# Patient Record
Sex: MALE | Race: WHITE | NOT HISPANIC OR LATINO | Employment: FULL TIME
[De-identification: names, ages, dates, MRNs, and addresses within clinical notes are randomized per-mention and may not be internally consistent; named-entity substitution may affect disease eponyms.]

---

## 2024-04-15 ENCOUNTER — TRANSCRIBE ORDERS (OUTPATIENT)
Dept: OTHER | Age: 54
End: 2024-04-15

## 2024-04-15 DIAGNOSIS — M62.561 ATROPHY OF MUSCLE OF RIGHT LOWER LEG: Primary | ICD-10-CM

## 2024-04-26 NOTE — TELEPHONE ENCOUNTER
DIAGNOSIS: running gait    APPOINTMENT DATE: 5/10/2024   NOTES STATUS DETAILS   OFFICE NOTE from referring provider  self referral    OFFICE NOTE from other specialist Care Everywhere 4/12/2024 Atrophy of muscle of right lower leg

## 2024-05-09 ENCOUNTER — TELEPHONE (OUTPATIENT)
Dept: ORTHOPEDICS | Facility: CLINIC | Age: 54
End: 2024-05-09
Payer: COMMERCIAL

## 2024-05-09 DIAGNOSIS — R29.898 RIGHT LEG WEAKNESS: Primary | ICD-10-CM

## 2024-05-09 NOTE — TELEPHONE ENCOUNTER
PEEWEE LVM for patient to return call to clinic at [phone] to further discuss his appointment with Dr. Mitchell on 5/10/2024 for his right leg. Patient is being referred by Dr. Johanny Morgan with [facility].    PEEWEE is inquiring if patient has had any imaging completed of his lower legs or low back.    PEEWEE Glez

## 2024-05-09 NOTE — TELEPHONE ENCOUNTER
Other: FYI- returning msg- last imaging was done in 2003 @ [facility]. Has a copy on disc he's bringing

## 2024-05-10 ENCOUNTER — PRE VISIT (OUTPATIENT)
Dept: ORTHOPEDICS | Facility: CLINIC | Age: 54
End: 2024-05-10

## 2024-05-10 ENCOUNTER — TELEPHONE (OUTPATIENT)
Dept: ORTHOPEDICS | Facility: CLINIC | Age: 54
End: 2024-05-10

## 2024-05-10 ENCOUNTER — OFFICE VISIT (OUTPATIENT)
Dept: ORTHOPEDICS | Facility: CLINIC | Age: 54
End: 2024-05-10
Payer: COMMERCIAL

## 2024-05-10 ENCOUNTER — ANCILLARY PROCEDURE (OUTPATIENT)
Dept: GENERAL RADIOLOGY | Facility: CLINIC | Age: 54
End: 2024-05-10
Attending: PREVENTIVE MEDICINE
Payer: COMMERCIAL

## 2024-05-10 DIAGNOSIS — M51.369 DDD (DEGENERATIVE DISC DISEASE), LUMBAR: Primary | ICD-10-CM

## 2024-05-10 DIAGNOSIS — M51.06 LUMBAR DISC HERNIATION WITH MYELOPATHY: ICD-10-CM

## 2024-05-10 DIAGNOSIS — M62.561 ATROPHY OF MUSCLE OF RIGHT LOWER LEG: ICD-10-CM

## 2024-05-10 DIAGNOSIS — R29.898 RIGHT LEG WEAKNESS: ICD-10-CM

## 2024-05-10 PROCEDURE — 99204 OFFICE O/P NEW MOD 45 MIN: CPT | Performed by: PREVENTIVE MEDICINE

## 2024-05-10 PROCEDURE — 72100 X-RAY EXAM L-S SPINE 2/3 VWS: CPT | Performed by: STUDENT IN AN ORGANIZED HEALTH CARE EDUCATION/TRAINING PROGRAM

## 2024-05-10 RX ORDER — ROSUVASTATIN CALCIUM 10 MG/1
10 TABLET, COATED ORAL DAILY
COMMUNITY
Start: 2022-12-16

## 2024-05-10 NOTE — TELEPHONE ENCOUNTER
PEEWEE spoke with patient who confirmed he has only had an XR of his right knee completed back in 2003. He explains that he lives an active life style and continues to run competitively. He has noticed over the last 2-3 months he has experience right leg weakness. He has been to physical therapy and who explained he is experiencing muscle atrophy over his right quad.He denies pain, tingling, numbing or burning sensations.     ATC explained that the cause of this issue could be coming from his lumbar spine and the patient agreed to XR's of lumbar spine today. Patient will arrive at 1:45pm.    Patient was appreciative of the return call and had no further questions.    PEEWEE Glez

## 2024-05-10 NOTE — PROGRESS NOTES
HISTORY OF PRESENT ILLNESS  Mr. Wyatt is a pleasant 53 year old year old male who presents to clinic today with the following:    What problem are you here for: Evaluation of right lateral thigh weakness and muscle atrophy. He reports this issue does not affect his running or activity levels.     How long have you had this problem: 1 month    Have you had any recent imaging of this problem? Xrays/MRI/CT scans:  - XR of lumbar spine completed at appointment today.     Have you had treatments for this problem in the past?  -Medications: None   -Physical therapy: Yes, December 2023 he started physical therapy at [facility] for his right calf/Achilles.   -Injections: None   -Surgery: None   - Acupuncture Treatment    How severe is this problem today? 0-10 scale: 0/10    What do you think caused this problem: None     Does this problem or its symptoms cause difficulty for you falling asleep or staying asleep: No     Anything else you want us to know about this problem:  - Patient runs 40-45 miles per week.   - Works with a  for overall strength.   - Pediatrician in [place] ([facility]).     MEDICAL HISTORY  There is no problem list on file for this patient.      Current Outpatient Medications   Medication Sig Dispense Refill     rosuvastatin (CRESTOR) 10 MG tablet Take 10 mg by mouth daily         No Known Allergies    No family history on file.  Social History     Socioeconomic History     Marital status:      Social Determinants of Health      Received from [employer] & [facility]    Social [place]       Additional medical/Social/Surgical histories reviewed in [facility] and updated as appropriate.     REVIEW OF SYSTEMS (5/10/2024)  10 point ROS of systems including Constitutional, Eyes, Respiratory, Cardiovascular, Gastroenterology, Genitourinary, Integumentary, Musculoskeletal, Psychiatric, Allergic/Immunologic were all negative except for pertinent positives noted in  my HPI.     PHYSICAL EXAM  VSS    General  - normal appearance, in no obvious distress  HEENT  - conjunctivae not injected, moist mucous membranes, normocephalic/atraumatic head, ears normal appearance, no lesions, mouth normal appearance, no scars, normal dentition and teeth present  CV  - normal peripheral perfusion  Pulm  - normal respiratory pattern, non-labored  Musculoskeletal - lumbar spine  - stance: normal gait without limp, no obvious leg length discrepancy, normal heel and toe walk  - inspection: normal bone and joint alignment, no obvious scoliosis, has atrophy of right abductor/hamstring muscles vs. left  - palpation: no paravertebral or bony tenderness  - ROM: flexion does not exacerbates pain, normal extension, sidebending, rotation  - strength: lower extremities 5/5 in all planes  - special tests:  (+) straight leg raise- some right sided  (-) slump test  Neuro  - patellar and Achilles DTRs 2+ bilaterally, some right lower extremity sensory deficit throughout L5 distribution, grossly normal coordination, normal muscle tone  Skin  - no ecchymosis, erythema, warmth, or induration, no obvious rash  Psych  - interactive, appropriate, normal mood and affect      ASSESSMENT & PLAN  52 yo male with right leg muscle atrophy, sciatica, due to lumbar disc herniation, ddd, chronic nerve root impingement    I independently reviewed the following imaging studies:  Lumbar xray: shows ddd  Discussed and ordered lumbar MRI for further evaluation  Discussed and ordered EMG for evaluation of muscle atrophy      Activity as tolerated  Has completed over 6 weeks of physical therapy to improve symptoms in right leg and muscle atrophy    Appropriate PPE was utilized for prevention of spread of Covid-19.  Jordon Mitchell MD, CAM

## 2024-05-10 NOTE — TELEPHONE ENCOUNTER
Left Voicemail (1st Attempt) for the patient to call back and schedule the following:    Appointment type: return   Provider: dr. landry  Return date: couple days after mri and emg   Specialty phone number: [phone]  Additional appointment(s) needed: mri and emg  Additonal Notes: discuss the results of mri and emg.     Ene trotter Complex   Orthopedics, Podiatry, Sports Medicine, Ent ,Eye , Audiology, Adult Endocrine & Diabetes, Nutrition & Medication Therapy Management Specialties   [facility]

## 2024-05-10 NOTE — LETTER
5/10/2024      RE: Cristino Wyatt  [address] [facility] [address]     Dear Colleague,    Thank you for referring your patient, Cristino Wyatt, to the [facility] SPORTS MEDICINE CLINIC [place]. Please see a copy of my visit note below.    HISTORY OF PRESENT ILLNESS  Mr. Wyatt is a pleasant 53 year old year old male who presents to clinic today with the following:    What problem are you here for: Evaluation of right lateral thigh weakness and muscle atrophy. He reports this issue does not affect his running or activity levels.     How long have you had this problem: 1 month    Have you had any recent imaging of this problem? Xrays/MRI/CT scans:  - XR of lumbar spine completed at appointment today.     Have you had treatments for this problem in the past?  -Medications: None   -Physical therapy: Yes, December 2023 he started physical therapy at [facility] for his right calf/Achilles.   -Injections: None   -Surgery: None   - Acupuncture Treatment    How severe is this problem today? 0-10 scale: 0/10    What do you think caused this problem: None     Does this problem or its symptoms cause difficulty for you falling asleep or staying asleep: No     Anything else you want us to know about this problem:  - Patient runs 40-45 miles per week.   - Works with a  for overall strength.   - Pediatrician in [place] ([facility]).     MEDICAL HISTORY  There is no problem list on file for this patient.      Current Outpatient Medications   Medication Sig Dispense Refill    rosuvastatin (CRESTOR) 10 MG tablet Take 10 mg by mouth daily         No Known Allergies    No family history on file.  Social History     Socioeconomic History    Marital status:      Social Determinants of Health      Received from [employer] & [facility]    [employer]       Additional medical/Social/Surgical histories reviewed in [place] and updated as appropriate.     REVIEW  OF SYSTEMS (5/10/2024)  10 point ROS of systems including Constitutional, Eyes, Respiratory, Cardiovascular, Gastroenterology, Genitourinary, Integumentary, Musculoskeletal, Psychiatric, Allergic/Immunologic were all negative except for pertinent positives noted in my HPI.     PHYSICAL EXAM  VSS    General  - normal appearance, in no obvious distress  HEENT  - conjunctivae not injected, moist mucous membranes, normocephalic/atraumatic head, ears normal appearance, no lesions, mouth normal appearance, no scars, normal dentition and teeth present  CV  - normal peripheral perfusion  Pulm  - normal respiratory pattern, non-labored  Musculoskeletal - lumbar spine  - stance: normal gait without limp, no obvious leg length discrepancy, normal heel and toe walk  - inspection: normal bone and joint alignment, no obvious scoliosis, has atrophy of right abductor/hamstring muscles vs. left  - palpation: no paravertebral or bony tenderness  - ROM: flexion does not exacerbates pain, normal extension, sidebending, rotation  - strength: lower extremities 5/5 in all planes  - special tests:  (+) straight leg raise- some right sided  (-) slump test  Neuro  - patellar and Achilles DTRs 2+ bilaterally, some right lower extremity sensory deficit throughout L5 distribution, grossly normal coordination, normal muscle tone  Skin  - no ecchymosis, erythema, warmth, or induration, no obvious rash  Psych  - interactive, appropriate, normal mood and affect      ASSESSMENT & PLAN  52 yo male with right leg muscle atrophy, sciatica, due to lumbar disc herniation, ddd, chronic nerve root impingement    I independently reviewed the following imaging studies:  Lumbar xray: shows ddd  Discussed and ordered lumbar MRI for further evaluation  Discussed and ordered EMG for evaluation of muscle atrophy      Activity as tolerated  Has completed over 6 weeks of physical therapy to improve symptoms in right leg and muscle atrophy    Appropriate PPE was  utilized for prevention of spread of Covid-19.  Jordon iMtchell MD, CAM

## 2024-05-16 NOTE — TELEPHONE ENCOUNTER
Left Voicemail (2nd Attempt) for the patient to call back and schedule the following:     Appointment type: return   Provider: dr. landry  Return date: couple days after mri and emg   Specialty phone number: [phone]  Additional appointment(s) needed: mri and emg  Additonal Notes: discuss the results of mri and emg.      Ene trotter Complex   Orthopedics, Podiatry, Sports Medicine, Ent ,Eye , Audiology, Adult Endocrine & Diabetes, Nutrition & Medication Therapy Management Specialties   [facility]

## 2024-05-17 ENCOUNTER — TELEPHONE (OUTPATIENT)
Dept: ORTHOPEDICS | Facility: CLINIC | Age: 54
End: 2024-05-17
Payer: COMMERCIAL

## 2024-05-17 NOTE — TELEPHONE ENCOUNTER
Other: pt of Dr. Mitchell has an MRI scheduled on 5/31 which is soonest available.  He would like a f/unit(s) visit or telephone call for results before pt leaves on vacation on 6/9.  I'm finding soonest openings for Dr. Mitchell on 6/13.  Can you hep find a sooner appt for him?       Could we send this information to you in [place] or would you prefer to receive a phone call?:   Patient would prefer a phone call   Okay to leave a detailed message?: Yes at Cell number on file:    Telephone Information:   Mobile [phone]

## 2024-05-19 ENCOUNTER — HEALTH MAINTENANCE LETTER (OUTPATIENT)
Age: 54
End: 2024-05-19

## 2024-05-20 ENCOUNTER — MYC MEDICAL ADVICE (OUTPATIENT)
Dept: ORTHOPEDICS | Facility: CLINIC | Age: 54
End: 2024-05-20
Payer: COMMERCIAL

## 2024-05-20 NOTE — TELEPHONE ENCOUNTER
Patient Contacted for the patient to call back and schedule the following:    Appointment type: Return  Provider: Dr Mitchell  Return date: ext avail  Specialty phone number: [phone]  Additonal Notes: PT could not do Follow up in June with Paula, requesting sooner, sent to Paula's team regarding questions

## 2024-05-22 DIAGNOSIS — M62.561 ATROPHY OF MUSCLE OF RIGHT LOWER LEG: Primary | ICD-10-CM

## 2024-05-22 DIAGNOSIS — R29.898 RIGHT LEG WEAKNESS: ICD-10-CM

## 2024-05-29 ENCOUNTER — MYC MEDICAL ADVICE (OUTPATIENT)
Dept: ORTHOPEDICS | Facility: CLINIC | Age: 54
End: 2024-05-29
Payer: COMMERCIAL

## 2024-05-30 ENCOUNTER — TELEPHONE (OUTPATIENT)
Dept: ORTHOPEDICS | Facility: CLINIC | Age: 54
End: 2024-05-30
Payer: COMMERCIAL

## 2024-05-31 ENCOUNTER — ANCILLARY PROCEDURE (OUTPATIENT)
Dept: MRI IMAGING | Facility: CLINIC | Age: 54
End: 2024-05-31
Attending: PREVENTIVE MEDICINE
Payer: COMMERCIAL

## 2024-05-31 ENCOUNTER — ANCILLARY PROCEDURE (OUTPATIENT)
Dept: GENERAL RADIOLOGY | Facility: CLINIC | Age: 54
End: 2024-05-31
Attending: PREVENTIVE MEDICINE
Payer: COMMERCIAL

## 2024-05-31 DIAGNOSIS — M51.369 DDD (DEGENERATIVE DISC DISEASE), LUMBAR: ICD-10-CM

## 2024-05-31 DIAGNOSIS — M62.561 ATROPHY OF MUSCLE OF RIGHT LOWER LEG: ICD-10-CM

## 2024-05-31 DIAGNOSIS — R29.898 RIGHT LEG WEAKNESS: ICD-10-CM

## 2024-05-31 DIAGNOSIS — M51.06 LUMBAR DISC HERNIATION WITH MYELOPATHY: ICD-10-CM

## 2024-05-31 PROCEDURE — 72148 MRI LUMBAR SPINE W/O DYE: CPT | Mod: GC | Performed by: RADIOLOGY

## 2024-05-31 PROCEDURE — 77073 BONE LENGTH STUDIES: CPT | Performed by: RADIOLOGY

## 2024-06-03 ENCOUNTER — VIRTUAL VISIT (OUTPATIENT)
Dept: ORTHOPEDICS | Facility: CLINIC | Age: 54
End: 2024-06-03
Payer: COMMERCIAL

## 2024-06-03 DIAGNOSIS — R29.898 RIGHT LEG WEAKNESS: ICD-10-CM

## 2024-06-03 DIAGNOSIS — M51.06 LUMBAR DISC HERNIATION WITH MYELOPATHY: ICD-10-CM

## 2024-06-03 DIAGNOSIS — M62.561 ATROPHY OF MUSCLE OF RIGHT LOWER LEG: Primary | ICD-10-CM

## 2024-06-03 PROCEDURE — 99213 OFFICE O/P EST LOW 20 MIN: CPT | Mod: 95 | Performed by: PREVENTIVE MEDICINE

## 2024-06-03 NOTE — LETTER
6/3/2024         RE: Cristino Wyatt  [address] [place] [id number]        Dear Colleague,    Thank you for referring your patient, Cristino Wyatt, to the [facility] SP[facility]TS MEDICINE CLINIC [place]. Please see a copy of my visit note below.    Cristino is a 53 year old who is being evaluated via a billable video visit.    How would you like to obtain your AVS? MyChart  If the video visit is dropped, the invitation should be resent by: Text to cell phone: [phone]  Will anyone else be joining your video visit? No    No follow-ups on file.    Subjective  Cristino is a 53 year old, presenting for the following health issues:  RECHECK (Follow up to review lumbar MRI results from 5/31/2024 and six foot standing alignment XR. )    HPI     Followup for lumbar MRI and leg length xrays  Overall feels some improvement in strength and flexibility in right leg after starting more physical therapy  Wants to review his MRI and xrays  No new symptoms      Review of Systems  Constitutional, HEENT, cardiovascular, pulmonary, gi and gu systems are negative, except as otherwise noted.      Objective          Vitals:  No vitals were obtained today due to virtual visit.    Physical Exam   GENERAL: alert and no distress  EYES: Eyes grossly normal to inspection.  No discharge or erythema, or obvious scleral/conjunctival abnormalities.  RESP: No audible wheeze, cough, or visible cyanosis.    SKIN: Visible skin clear. No significant rash, abnormal pigmentation or lesions.  NEURO: Cranial nerves grossly intact.  Mentation and speech appropriate for age.  PSYCH: Appropriate affect, tone, and pace of words      Assessment/Plan  54 yo male with lumbar ddd, radiculopathy, right leg weakness, chronic, stable  Reviewed lumbar MRI shows : Lumbar spondylosis, greatest in L5-S1, with asymmetric  disc bulge and superimposed left extraforaminal disc protrusion  abutting the exiting L5 nerve. Additional disc bulge with  superimposed  left foraminal extrusion at L3-4. No spinal canal narrowing at any  level.   Discussed and reviewed xrays legs: WNL no significant leg length discrepancy  Cont. PT  Followup this fall after EMG or sooner if condition changes/worsens      Video-Visit Details    Type of service:  Video Visit   Originating Location (pt. Location): Home  Video start: 1120am  Video end:1140am  Distant Location (provider location):  On-site  Platform used for Video Visit: Vashti  Signed Electronically by: MD Dr Paula Tian      Again, thank you for allowing me to participate in the care of your patient.        Sincerely,        Jordon Mitchell MD

## 2024-06-03 NOTE — LETTER
6/3/2024         RE: Cristino Wyatt  [address] [place] [id number]        Dear Colleague,    Thank you for referring your patient, Cristino Wyatt, to the [facility] MEDIC[facility] [place]. Please see a copy of my visit note below.    Cristino is a 53 year old who is being evaluated via a billable video visit.    How would you like to obtain your AVS? MyChart  If the video visit is dropped, the invitation should be resent by: Text to cell phone: [phone]  Will anyone else be joining your video visit? No    No follow-ups on file.    Subjective  Cristino is a 53 year old, presenting for the following health issues:  RECHECK (Follow up to review lumbar MRI results from 5/31/2024 and six foot standing alignment XR. )    HPI     Followup for lumbar MRI and leg length xrays  Overall feels some improvement in strength and flexibility in right leg after starting more physical therapy  Wants to review his MRI and xrays  No new symptoms      Review of Systems  Constitutional, HEENT, cardiovascular, pulmonary, gi and gu systems are negative, except as otherwise noted.      Objective          Vitals:  No vitals were obtained today due to virtual visit.    Physical Exam   GENERAL: alert and no distress  EYES: Eyes grossly normal to inspection.  No discharge or erythema, or obvious scleral/conjunctival abnormalities.  RESP: No audible wheeze, cough, or visible cyanosis.    SKIN: Visible skin clear. No significant rash, abnormal pigmentation or lesions.  NEURO: Cranial nerves grossly intact.  Mentation and speech appropriate for age.  PSYCH: Appropriate affect, tone, and pace of words      Assessment/Plan  54 yo male with lumbar ddd, radiculopathy, right leg weakness, chronic, stable  Reviewed lumbar MRI shows : Lumbar spondylosis, greatest in L5-S1, with asymmetric  disc bulge and superimposed left extraforaminal disc protrusion  abutting the exiting L5 nerve. Additional disc bulge with  superimposed  left foraminal extrusion at L3-4. No spinal canal narrowing at any  level.   Discussed and reviewed xrays legs: WNL no significant leg length discrepancy  Cont. PT  Followup this fall after EMG or sooner if condition changes/worsens      Video-Visit Details    Type of service:  Video Visit   Originating Location (pt. Location): Home  Video start: 1120am  Video end:1140am  Distant Location (provider location):  On-site  Platform used for Video Visit: Vashti  Signed Electronically by: MD Dr Paula Tian      Again, thank you for allowing me to participate in the care of your patient.        Sincerely,        Jordon Mitchell MD

## 2024-06-03 NOTE — PROGRESS NOTES
Cristino is a 53 year old who is being evaluated via a billable video visit.    How would you like to obtain your AVS? MyChart  If the video visit is dropped, the invitation should be resent by: Text to cell phone: [phone]  Will anyone else be joining your video visit? No    No follow-ups on file.    Subjective   Cristino is a 53 year old, presenting for the following health issues:  RECHECK (Follow up to review lumbar MRI results from 5/31/2024 and six foot standing alignment XR. )    HPI     Followup for lumbar MRI and leg length xrays  Overall feels some improvement in strength and flexibility in right leg after starting more physical therapy  Wants to review his MRI and xrays  No new symptoms      Review of Systems  Constitutional, HEENT, cardiovascular, pulmonary, gi and gu systems are negative, except as otherwise noted.      Objective           Vitals:  No vitals were obtained today due to virtual visit.    Physical Exam   GENERAL: alert and no distress  EYES: Eyes grossly normal to inspection.  No discharge or erythema, or obvious scleral/conjunctival abnormalities.  RESP: No audible wheeze, cough, or visible cyanosis.    SKIN: Visible skin clear. No significant rash, abnormal pigmentation or lesions.  NEURO: Cranial nerves grossly intact.  Mentation and speech appropriate for age.  PSYCH: Appropriate affect, tone, and pace of words      Assessment/Plan  52 yo male with lumbar ddd, radiculopathy, right leg weakness, chronic, stable  Reviewed lumbar MRI shows : Lumbar spondylosis, greatest in L5-S1, with asymmetric  disc bulge and superimposed left extraforaminal disc protrusion  abutting the exiting L5 nerve. Additional disc bulge with superimposed  left foraminal extrusion at L3-4. No spinal canal narrowing at any  level.   Discussed and reviewed xrays legs: WNL no significant leg length discrepancy  Cont. PT  Followup this fall after EMG or sooner if condition changes/worsens      Video-Visit  Details    Type of service:  Video Visit   Originating Location (pt. Location): Home  Video start: 1120am  Video end:1140am  Distant Location (provider location):  On-site  Platform used for Video Visit: Vashti  Signed Electronically by: MD Dr Paula Tian

## 2024-09-19 ENCOUNTER — OFFICE VISIT (OUTPATIENT)
Dept: NEUROLOGY | Facility: CLINIC | Age: 54
End: 2024-09-19
Attending: PREVENTIVE MEDICINE
Payer: COMMERCIAL

## 2024-09-19 DIAGNOSIS — M51.369 DDD (DEGENERATIVE DISC DISEASE), LUMBAR: ICD-10-CM

## 2024-09-19 DIAGNOSIS — M62.561 ATROPHY OF MUSCLE OF RIGHT LOWER LEG: ICD-10-CM

## 2024-09-19 DIAGNOSIS — R29.898 RIGHT LEG WEAKNESS: ICD-10-CM

## 2024-09-19 PROCEDURE — 95886 MUSC TEST DONE W/N TEST COMP: CPT | Performed by: INTERNAL MEDICINE

## 2024-09-19 PROCEDURE — 95885 MUSC TST DONE W/NERV TST LIM: CPT | Mod: 59 | Performed by: INTERNAL MEDICINE

## 2024-09-19 PROCEDURE — 95910 NRV CNDJ TEST 7-8 STUDIES: CPT | Performed by: INTERNAL MEDICINE

## 2024-09-19 NOTE — LETTER
2024      Cristino Wyatt  [address] [facility] [address]      Dear Colleague,    Thank you for referring your patient, Cristino Wyatt, to the [facility] NEUROLOGY CLINIC [place]. Please see a copy of my visit note below.                        [facility]  Electrodiagnostic Laboratory                 Department of Neurology                                                                                                         Test Date:  2024    Patient: Cristino Wyatt : 1970 Physician: George Lacey MD   Sex: Male AGE: 54 year Ref Phys:    ID#: [id number]   Technician: Francisco Rodriguez     History and Examination:  Patient is a 55 y/o male who presents for evaluation of right hamstring atrophy. EMG ordered to evaluate for radiculopathy.     Techniques:  Motor conduction studies were done with surface recording electrodes. Sensory conduction studies were performed with surface electrodes, unless indicated otherwise by (n), designating the use of subdermal recording electrodes. Temperature was monitored and recorded throughout the study. Upper extremities were maintained at a temperature of 32 degrees Centigrade or higher.  EMG was done with a concentric needle electrode.     Results:  All nerve conduction studies (as indicated in the following tables) were within normal limits.      All F Wave latencies were within normal limits.      With the exception of possible small short duration motor unit action potentials in the right semitendinosus, all examined muscles (as indicated in the following table) showed no evidence of electrical instability.        Interpretation:  This is a mildly abnormal examination. There is electrophysiologic evidence of mild asymmetry in the right semitendinosus motor unit action potentials, which could be reflective for focal right sided myopathy in the corrective clinical context. MRI right thigh is recommended to evaluate for  radiographic myopathic changes in the right semitendinosus.     ___________________________  George Lacey MD        Nerve Conduction Studies  Motor Sites      Latency Neg. Amp Neg. Amp Diff Segment Distance Velocity Neg. Dur Neg Area Diff Temperature Comment   Site (ms) Norm (mV) Norm (%)  cm m/s Norm (ms) (%) ( C)    Left Fibular (EDB) Motor   Ankle 5.3  < 6.0 4.3 -  Ankle-EDB 8   4.7  30.9    Bel Fibular Head 11.6 - 3.8 - -12 Bel Fibular Head-Ankle 29.5 47  > 38 4.8 -12 30.9    Pop Fossa 13.6 - 3.5 - -8 Pop Fossa-Bel Fibular Head 10 50  > 38 4.8 -3 30.9    Right Fibular (EDB) Motor   Ankle 4.8  < 6.0 4.4 -  Ankle-EDB 8   5.6  30.8    Bel Fibular Head 11.3 - 4.3 - -2 Bel Fibular Head-Ankle 29 45  > 38 5.6 2 30.8    Pop Fossa 13.4 - 3.8 - -12 Pop Fossa-Bel Fibular Head 10 48  > 38 5.8 -8 30.7    Left Tibial (AHB) Motor   Ankle 3.9  < 6.5 14.2  > 5.0  Ankle-AH 8   6.0  31    Knee 13.4 - 8.2 - -42 Knee-Ankle 41 43  > 38 6.4 -28 31    Right Tibial (AHB) Motor   Ankle 6.1  < 6.5 12.2  > 5.0  Ankle-AH 8   3.3  30.8    Knee 15.3 - 8.8 - -28 Knee-Ankle 44 48  > 38 3.8 -21 30.8      F-Wave Sites      Min F-Lat Max-Min F-Lat Mean F-Lat   Site (ms) (ms) (ms)   Left Tibial F-Wave   Ankle 50.5 3.0 -   Right Tibial F-Wave   Ankle 46.2 5.7 -     Sensory Sites      Onset Lat Peak Lat Amp (O-P) Amp (P-P) Segment Distance Velocity Temperature Comment   Site ms (ms)  V Norm ( V)  cm m/s Norm ( C)    Left Superficial Fibular Sensory   Lower Leg-Ankle 2.5 3.3 28  > 3 32 Lower Leg-Ankle 12.5 50 - 31    Right Superficial Fibular Sensory   Lower Leg-Ankle 2.4 3.2 27  > 3 31 Lower Leg-Ankle 12.5 52 - 31.6    Left Sural Sensory   Calf-Lat Malleolus 3.1 4.0 23  > 5 32 Calf-Lat Malleolus 14 45  > 38 30.5    Right Sural Sensory   Calf-Lat Malleolus 3.0 3.8 34  > 5 46 Calf-Lat Malleolus 14 47  > 38 30.8        Electromyography     Side Muscle Ins Act Fibs/PSW Fasc HF Amp Dur Poly Recrt Int Pat   Right Tib ant Nml None Nml 0 Nml Nml 0 Nml Nml    Right Gastroc Nml None Nml 0 Nml Nml 0 Nml Nml   Right Vastus med Nml None Nml 0 Nml Nml 0 Nml Nml   Right Iliopsoas Nml None Nml 0 Nml Nml 0 Nml Nml   Left Gastroc Nml None Nml 0 Nml Nml 0 Nml Nml   Right Semitendinosus Nml None Nml 0 1- 1- 0 Nml Nml   Right Biceps fem LH Nml None Nml 0 Nml Nml 0 Nml Nml   Right Biceps fem SH Nml None Nml 0 Nml Nml 0 Nml Nml   Left Gluteus max Nml None Nml 0 Nml Nml 0 Nml Nml   Left  Semitendinosus Nml None Nml 0 Nml Nml 0 Nml Nml   Right Gluteus med Nml None Nml 0 Nml Nml 0 Nml Nml         NCS Waveforms:    Motor                Sensory                F-Wave                 Again, thank you for allowing me to participate in the care of your patient.        Sincerely,        George Lacey MD

## 2024-09-19 NOTE — PROGRESS NOTES
[facility]  Electrodiagnostic Laboratory                 Department of Neurology                                                                                                         Test Date:  2024    Patient: Cristino Wyatt : 1970 Physician: George Lacey MD   Sex: Male AGE: 54 year Ref Phys:    ID#: [id number]   Technician: Francisco Rodriguez     History and Examination:  Patient is a 55 y/o male who presents for evaluation of right hamstring atrophy. EMG ordered to evaluate for radiculopathy.     Techniques:  Motor conduction studies were done with surface recording electrodes. Sensory conduction studies were performed with surface electrodes, unless indicated otherwise by (n), designating the use of subdermal recording electrodes. Temperature was monitored and recorded throughout the study. Upper extremities were maintained at a temperature of 32 degrees Centigrade or higher.  EMG was done with a concentric needle electrode.     Results:  All nerve conduction studies (as indicated in the following tables) were within normal limits.      All F Wave latencies were within normal limits.      With the exception of possible small short duration motor unit action potentials in the right semitendinosus, all examined muscles (as indicated in the following table) showed no evidence of electrical instability.        Interpretation:  This is a mildly abnormal examination. There is electrophysiologic evidence of mild asymmetry in the right semitendinosus motor unit action potentials, which could be reflective for focal right sided myopathy in the corrective clinical context. MRI right thigh is recommended to evaluate for radiographic myopathic changes in the right semitendinosus.     ___________________________  George Lacey MD        Nerve Conduction Studies  Motor Sites      Latency Neg. Amp Neg. Amp Diff Segment Distance Velocity Neg. Dur Neg Area Diff Temperature Comment    Site (ms) Norm (mV) Norm (%)  cm m/s Norm (ms) (%) ( C)    Left Fibular (EDB) Motor   Ankle 5.3  < 6.0 4.3 -  Ankle-EDB 8   4.7  30.9    Bel Fibular Head 11.6 - 3.8 - -12 Bel Fibular Head-Ankle 29.5 47  > 38 4.8 -12 30.9    Pop Fossa 13.6 - 3.5 - -8 Pop Fossa-Bel Fibular Head 10 50  > 38 4.8 -3 30.9    Right Fibular (EDB) Motor   Ankle 4.8  < 6.0 4.4 -  Ankle-EDB 8   5.6  30.8    Bel Fibular Head 11.3 - 4.3 - -2 Bel Fibular Head-Ankle 29 45  > 38 5.6 2 30.8    Pop Fossa 13.4 - 3.8 - -12 Pop Fossa-Bel Fibular Head 10 48  > 38 5.8 -8 30.7    Left Tibial (AHB) Motor   Ankle 3.9  < 6.5 14.2  > 5.0  Ankle-AH 8   6.0  31    Knee 13.4 - 8.2 - -42 Knee-Ankle 41 43  > 38 6.4 -28 31    Right Tibial (AHB) Motor   Ankle 6.1  < 6.5 12.2  > 5.0  Ankle-AH 8   3.3  30.8    Knee 15.3 - 8.8 - -28 Knee-Ankle 44 48  > 38 3.8 -21 30.8      F-Wave Sites      Min F-Lat Max-Min F-Lat Mean F-Lat   Site (ms) (ms) (ms)   Left Tibial F-Wave   Ankle 50.5 3.0 -   Right Tibial F-Wave   Ankle 46.2 5.7 -     Sensory Sites      Onset Lat Peak Lat Amp (O-P) Amp (P-P) Segment Distance Velocity Temperature Comment   Site ms (ms)  V Norm ( V)  cm m/s Norm ( C)    Left Superficial Fibular Sensory   Lower Leg-Ankle 2.5 3.3 28  > 3 32 Lower Leg-Ankle 12.5 50 - 31    Right Superficial Fibular Sensory   Lower Leg-Ankle 2.4 3.2 27  > 3 31 Lower Leg-Ankle 12.5 52 - 31.6    Left Sural Sensory   Calf-Lat Malleolus 3.1 4.0 23  > 5 32 Calf-Lat Malleolus 14 45  > 38 30.5    Right Sural Sensory   Calf-Lat Malleolus 3.0 3.8 34  > 5 46 Calf-Lat Malleolus 14 47  > 38 30.8        Electromyography     Side Muscle Ins Act Fibs/PSW Fasc HF Amp Dur Poly Recrt Int Pat   Right Tib ant Nml None Nml 0 Nml Nml 0 Nml Nml   Right Gastroc Nml None Nml 0 Nml Nml 0 Nml Nml   Right Vastus med Nml None Nml 0 Nml Nml 0 Nml Nml   Right Iliopsoas Nml None Nml 0 Nml Nml 0 Nml Nml   Left Gastroc Nml None Nml 0 Nml Nml 0 Nml Nml   Right Semitendinosus Nml None Nml 0 1- 1- 0 Nml Nml   Right  Biceps fem LH Nml None Nml 0 Nml Nml 0 Nml Nml   Right Biceps fem SH Nml None Nml 0 Nml Nml 0 Nml Nml   Left Gluteus max Nml None Nml 0 Nml Nml 0 Nml Nml   Left  Semitendinosus Nml None Nml 0 Nml Nml 0 Nml Nml   Right Gluteus med Nml None Nml 0 Nml Nml 0 Nml Nml         NCS Waveforms:    Motor                Sensory                F-Wave

## 2024-09-23 ENCOUNTER — VIRTUAL VISIT (OUTPATIENT)
Dept: ORTHOPEDICS | Facility: CLINIC | Age: 54
End: 2024-09-23
Payer: COMMERCIAL

## 2024-09-23 DIAGNOSIS — M62.561 ATROPHY OF MUSCLE OF RIGHT LOWER LEG: Primary | ICD-10-CM

## 2024-09-23 DIAGNOSIS — R29.898 RIGHT LEG WEAKNESS: ICD-10-CM

## 2024-09-23 PROCEDURE — 99442 PR PHYSICIAN TELEPHONE EVALUATION 11-20 MIN: CPT | Mod: 93 | Performed by: PREVENTIVE MEDICINE

## 2024-09-23 NOTE — LETTER
9/23/2024      Cristino Wyatt  [address] [address]      Dear Colleague,    Thank you for referring your patient, Cristino Wyatt, to the [facility] [place]. Please see a copy of my visit note below.    Patient is a   54  year old who is being evaluated via a billable telephone visit.      What phone number would you like to be contacted at? CELL  How would you like to obtain your AVS? MYCHART        Subjective   Patient is a  54   year old who presents by phone call visit for the following:     HPI   Followup for EMG   Overall feeling better  He is feeling reassured, but wanted to discuss possible ordering of MRI for thigh      Review of Systems   Constitutional, HEENT, cardiovascular, pulmonary, gi and gu systems are negative, except as otherwise noted.      Objective           Vitals:  No vitals were obtained today due to virtual visit.    Physical Exam   healthy, alert, and no distress  PSYCH: Alert and oriented times 3; coherent speech, normal   rate and volume, able to articulate logical thoughts, able   to abstract reason, no tangential thoughts, no hallucinations   or delusions  His affect is normal  RESP: No cough, no audible wheezing, able to talk in full sentences  Remainder of exam unable to be completed due to telephone visits    Assessment/Plan  55 yo male with history of right leg muscle weakness, atrophy, followup for EMG    I independently reviewed the following studies: EMG lower extremity  Discussed ordering MRI for thigh  Otherwise doing well  Cont. HEP          Phone call duration 20 minutes  Phone call start: 820am  Phone call end: 840am  Dr Mitchell      Again, thank you for allowing me to participate in the care of your patient.        Sincerely,        Jordon Mitchell MD

## 2024-09-23 NOTE — LETTER
9/23/2024      Cristino Wyatt  [address] [facility] [address]      Dear Colleague,    Thank you for referring your patient, Cristino Wyatt, to the [facility] [place]. Please see a copy of my visit note below.    Patient is a   54  year old who is being evaluated via a billable telephone visit.      What phone number would you like to be contacted at? CELL  How would you like to obtain your AVS? MYCHART        Subjective   Patient is a  54   year old who presents by phone call visit for the following:     HPI   Followup for EMG   Overall feeling better  He is feeling reassured, but wanted to discuss possible ordering of MRI for thigh      Review of Systems   Constitutional, HEENT, cardiovascular, pulmonary, gi and gu systems are negative, except as otherwise noted.      Objective           Vitals:  No vitals were obtained today due to virtual visit.    Physical Exam   healthy, alert, and no distress  PSYCH: Alert and oriented times 3; coherent speech, normal   rate and volume, able to articulate logical thoughts, able   to abstract reason, no tangential thoughts, no hallucinations   or delusions  His affect is normal  RESP: No cough, no audible wheezing, able to talk in full sentences  Remainder of exam unable to be completed due to telephone visits    Assessment/Plan  53 yo male with history of right leg muscle weakness, atrophy, followup for EMG    I independently reviewed the following studies: EMG lower extremity  Discussed ordering MRI for thigh  Otherwise doing well  Cont. HEP          Phone call duration 20 minutes  Phone call start: 820am  Phone call end: 840am  Dr Mitchell      Again, thank you for allowing me to participate in the care of your patient.        Sincerely,        Jordon Mitchell MD

## 2024-09-23 NOTE — PROGRESS NOTES
Patient is a   54  year old who is being evaluated via a billable telephone visit.      What phone number would you like to be contacted at? CELL  How would you like to obtain your AVS? FREIDA        Subjective   Patient is a  54   year old who presents by phone call visit for the following:     HPI   Followup for EMG   Overall feeling better  He is feeling reassured, but wanted to discuss possible ordering of MRI for thigh      Review of Systems   Constitutional, HEENT, cardiovascular, pulmonary, gi and gu systems are negative, except as otherwise noted.      Objective           Vitals:  No vitals were obtained today due to virtual visit.    Physical Exam   healthy, alert, and no distress  PSYCH: Alert and oriented times 3; coherent speech, normal   rate and volume, able to articulate logical thoughts, able   to abstract reason, no tangential thoughts, no hallucinations   or delusions  His affect is normal  RESP: No cough, no audible wheezing, able to talk in full sentences  Remainder of exam unable to be completed due to telephone visits    Assessment/Plan  55 yo male with history of right leg muscle weakness, atrophy, followup for EMG    I independently reviewed the following studies: EMG lower extremity  Discussed ordering MRI for thigh  Otherwise doing well  Cont. HEP          Phone call duration 20 minutes  Phone call start: 820am  Phone call end: 840am  Dr Mitchell

## 2024-12-09 ENCOUNTER — VIRTUAL VISIT (OUTPATIENT)
Dept: ORTHOPEDICS | Facility: CLINIC | Age: 54
End: 2024-12-09
Payer: COMMERCIAL

## 2024-12-09 DIAGNOSIS — R29.898 RIGHT LEG WEAKNESS: Primary | ICD-10-CM

## 2024-12-09 DIAGNOSIS — M62.561 ATROPHY OF MUSCLE OF RIGHT LOWER LEG: ICD-10-CM

## 2024-12-09 PROCEDURE — 99442 PR PHYSICIAN TELEPHONE EVALUATION 11-20 MIN: CPT | Mod: 93 | Performed by: PREVENTIVE MEDICINE

## 2024-12-09 NOTE — LETTER
12/9/2024      Cristino Wyatt  [address] [address]      Dear Colleague,    Thank you for referring your patient, Cristino Wyatt, to the [facility] [place]. Please see a copy of my visit note below.    Patient is a   54  year old who is being evaluated via a billable telephone visit.      What phone number would you like to be contacted at? CELL  How would you like to obtain your AVS? MYCHART        Subjective   Patient is a   54  year old who presents by phone call visit for the following:     HPI   Followup for right leg hamstring atrophy and discomfort and abnormal EMG  Wants to discuss ordering an MRI for femur/hamstring as previously discussed and offered  Doing ok otherwise    Review of Systems   Constitutional, HEENT, cardiovascular, pulmonary, gi and gu systems are negative, except as otherwise noted.      Objective           Vitals:  No vitals were obtained today due to virtual visit.    Physical Exam   healthy, alert, and no distress  PSYCH: Alert and oriented times 3; coherent speech, normal   rate and volume, able to articulate logical thoughts, able   to abstract reason, no tangential thoughts, no hallucinations   or delusions  His affect is normal  RESP: No cough, no audible wheezing, able to talk in full sentences  Remainder of exam unable to be completed due to telephone visits    Assessment/Plan   53 yo male with history of right leg muscle weakness, atrophy, abnormal EMG    I independently reviewed the following imaging studies and discussed with patient:  EMG shows abnormal signal for right leg for semitendinosus primarily  Will order MRI of femur and hamstring for further evaluation            Phone call duration: 20 minutes  Phone call start: 1000am  Phone call end: 1020am  Dr Mitchell      Again, thank you for allowing me to participate in the care of your patient.        Sincerely,      Jordon Mitchell MD

## 2024-12-09 NOTE — LETTER
12/9/2024      Cristino Wyatt  [address] [address]      Dear Colleague,    Thank you for referring your patient, Cristino Wyatt, to the [facility] [place]. Please see a copy of my visit note below.    Patient is a   54  year old who is being evaluated via a billable telephone visit.      What phone number would you like to be contacted at? CELL  How would you like to obtain your AVS? MYCHART        Subjective   Patient is a   54  year old who presents by phone call visit for the following:     HPI   Followup for right leg hamstring atrophy and discomfort and abnormal EMG  Wants to discuss ordering an MRI for femur/hamstring as previously discussed and offered  Doing ok otherwise    Review of Systems   Constitutional, HEENT, cardiovascular, pulmonary, gi and gu systems are negative, except as otherwise noted.      Objective           Vitals:  No vitals were obtained today due to virtual visit.    Physical Exam   healthy, alert, and no distress  PSYCH: Alert and oriented times 3; coherent speech, normal   rate and volume, able to articulate logical thoughts, able   to abstract reason, no tangential thoughts, no hallucinations   or delusions  His affect is normal  RESP: No cough, no audible wheezing, able to talk in full sentences  Remainder of exam unable to be completed due to telephone visits    Assessment/Plan   53 yo male with history of right leg muscle weakness, atrophy, abnormal EMG    I independently reviewed the following imaging studies and discussed with patient:  EMG shows abnormal signal for right leg for semitendinosus primarily  Will order MRI of femur and hamstring for further evaluation            Phone call duration: 20 minutes  Phone call start: 1000am  Phone call end: 1020am  Dr Mitchell      Again, thank you for allowing me to participate in the care of your patient.        Sincerely,      Jordon Mitchell MD

## 2025-01-29 ENCOUNTER — VIRTUAL VISIT (OUTPATIENT)
Dept: ORTHOPEDICS | Facility: CLINIC | Age: 55
End: 2025-01-29
Attending: PREVENTIVE MEDICINE
Payer: COMMERCIAL

## 2025-01-29 DIAGNOSIS — M62.561 ATROPHY OF MUSCLE OF RIGHT LOWER LEG: Primary | ICD-10-CM

## 2025-01-29 PROCEDURE — 98013 SYNCH AUDIO-ONLY EST LOW 20: CPT | Performed by: PREVENTIVE MEDICINE

## 2025-01-29 NOTE — PROGRESS NOTES
Cristino is a 54 year old who is being evaluated via a billable telephone visit.    What phone number would you like to be contacted at? limited  How would you like to obtain your AVS? Freida  Originating Location (pt. Location): Home    Distant Location (provider location):  On-site  Telephone visit completed due to the patient did not consent to a video visit.  Phone call duration: 20 minutes  Patient is a  54   year old who is being evaluated via a billable telephone visit.      What phone number would you like to be contacted at? CELL  How would you like to obtain your AVS? FREIDA        Subjective   Patient is a   54  year old who presents by phone call visit for the following:     HPI   Followup for right thigh muscle atrophy and MRI  Overall feeling better  Wants to review MRI   And get referral to neurology        Review of Systems   Constitutional, HEENT, cardiovascular, pulmonary, gi and gu systems are negative, except as otherwise noted.      Objective           Vitals:  No vitals were obtained today due to virtual visit.    Physical Exam   healthy, alert, and no distress  PSYCH: Alert and oriented times 3; coherent speech, normal   rate and volume, able to articulate logical thoughts, able   to abstract reason, no tangential thoughts, no hallucinations   or delusions  His affect is normal  RESP: No cough, no audible wheezing, able to talk in full sentences  Remainder of exam unable to be completed due to telephone visits    Assessment/Plan  53 yo male with history of right leg muscle weakness and atrophy and abnormal EMG     I independently reviewed the following imaging studies and discussed with patient:  Right thigh MRI : 1.  Isolated fatty atrophy of the right-sided hamstring musculature. No proximal detachment. No suspicion of distal detachment.   2.  Statistically most likely causes would include posttraumatic, postinflammatory, or post denervation/disuse. No clear cause demonstrated on this  examination.   3.  No lesion or lymphadenopathy causing neurovascular impingement.   4.  No other muscle signal abnormality or atrophy demonstrated within the right thigh or the visualized left thigh.     Discussed and referred to neurology for consultation        Phone call duration: 20 minutes  Phone call start: 1140am  Phone call end: 1200pm  Dr Mitchell

## 2025-01-29 NOTE — LETTER
1/29/2025      Cristino Wyatt  [address] [address]      Dear Colleague,    Thank you for referring your patient, Cristino Wyatt, to the [facility] [place]. Please see a copy of my visit note below.    Cristino is a 54 year old who is being evaluated via a billable telephone visit.    What phone number would you like to be contacted at? limited  How would you like to obtain your AVS? Freida  Originating Location (pt. Location): Home  {PROVIDER LOCATION On-site should be selected for visits conducted from your clinic location or adjoining [facility] hospital, academic office, or other nearby [facility] building. Off-site should be selected for all other provider locations, including home:[id number]}  Distant Location (provider location):  On-site  Telephone visit completed due to the patient did not consent to a video visit.  Phone call duration: 20 minutes  Patient is a  54   year old who is being evaluated via a billable telephone visit.      What phone number would you like to be contacted at? CELL  How would you like to obtain your AVS? FREIDA        Subjective   Patient is a   54  year old who presents by phone call visit for the following:     HPI   Followup for right thigh muscle atrophy and MRI  Overall feeling better  Wants to review MRI   And get referral to neurology        Review of Systems   Constitutional, HEENT, cardiovascular, pulmonary, gi and gu systems are negative, except as otherwise noted.      Objective           Vitals:  No vitals were obtained today due to virtual visit.    Physical Exam   healthy, alert, and no distress  PSYCH: Alert and oriented times 3; coherent speech, normal   rate and volume, able to articulate logical thoughts, able   to abstract reason, no tangential thoughts, no hallucinations   or delusions  His affect is normal  RESP: No cough, no audible wheezing, able to talk in full sentences  Remainder of exam unable to be completed  due to telephone visits    Assessment/Plan  55 yo male with history of right leg muscle weakness and atrophy and abnormal EMG     I independently reviewed the following imaging studies and discussed with patient:  Right thigh MRI : 1.  Isolated fatty atrophy of the right-sided hamstring musculature. No proximal detachment. No suspicion of distal detachment.   2.  Statistically most likely causes would include posttraumatic, postinflammatory, or post denervation/disuse. No clear cause demonstrated on this examination.   3.  No lesion or lymphadenopathy causing neurovascular impingement.   4.  No other muscle signal abnormality or atrophy demonstrated within the right thigh or the visualized left thigh.     Discussed and referred to neurology for consultation        Phone call duration: 20 minutes  Phone call start: 1140am  Phone call end: 1200pm  Dr Mitchell      Again, thank you for allowing me to participate in the care of your patient.        Sincerely,        Jordon Mitchell MD    Electronically signed

## 2025-01-29 NOTE — LETTER
1/29/2025      Cristino Wyatt  [address] [address]      Dear Colleague,    Thank you for referring your patient, Cristino Wyatt, to the [facility] [place]. Please see a copy of my visit note below.    Cristino is a 54 year old who is being evaluated via a billable telephone visit.    What phone number would you like to be contacted at? limited  How would you like to obtain your AVS? Freida  Originating Location (pt. Location): Home  {PROVIDER LOCATION On-site should be selected for visits conducted from your clinic location or adjoining [facility] hospital, academic office, or other nearby [facility] building. Off-site should be selected for all other provider locations, including home:[id number]}  Distant Location (provider location):  On-site  Telephone visit completed due to the patient did not consent to a video visit.  Phone call duration: 20 minutes  Patient is a  54   year old who is being evaluated via a billable telephone visit.      What phone number would you like to be contacted at? CELL  How would you like to obtain your AVS? FREIDA        Subjective   Patient is a   54  year old who presents by phone call visit for the following:     HPI   Followup for right thigh muscle atrophy and MRI  Overall feeling better  Wants to review MRI   And get referral to neurology        Review of Systems   Constitutional, HEENT, cardiovascular, pulmonary, gi and gu systems are negative, except as otherwise noted.      Objective           Vitals:  No vitals were obtained today due to virtual visit.    Physical Exam   healthy, alert, and no distress  PSYCH: Alert and oriented times 3; coherent speech, normal   rate and volume, able to articulate logical thoughts, able   to abstract reason, no tangential thoughts, no hallucinations   or delusions  His affect is normal  RESP: No cough, no audible wheezing, able to talk in full sentences  Remainder of exam unable to be completed  due to telephone visits    Assessment/Plan  53 yo male with history of right leg muscle weakness and atrophy and abnormal EMG     I independently reviewed the following imaging studies and discussed with patient:  Right thigh MRI : 1.  Isolated fatty atrophy of the right-sided hamstring musculature. No proximal detachment. No suspicion of distal detachment.   2.  Statistically most likely causes would include posttraumatic, postinflammatory, or post denervation/disuse. No clear cause demonstrated on this examination.   3.  No lesion or lymphadenopathy causing neurovascular impingement.   4.  No other muscle signal abnormality or atrophy demonstrated within the right thigh or the visualized left thigh.     Discussed and referred to neurology for consultation        Phone call duration: 20 minutes  Phone call start: 1140am  Phone call end: 1200pm  Dr Mitchell      Again, thank you for allowing me to participate in the care of your patient.        Sincerely,        Jordon Mitchell MD    Electronically signed

## 2025-05-13 NOTE — TELEPHONE ENCOUNTER
REASON FOR VISIT: Atrophy of muscle of right lower leg [M62.561]    DATE OF APPT: 5/28/2025   NOTES (FOR ALL VISITS) STATUS DETAILS   [facility]   MEDICATION LIST N/A    IMAGING  (FOR ALL VISITS)     XR N/A    MRI (HEAD, NECK, SPINE) Internal [facility]  MR Lumbar spine 5/31/2024   CT (HEAD, NECK, SPINE) N/A

## 2025-05-27 NOTE — PROGRESS NOTES
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 This was first noted in 2024, but it is unknown how long it has been there for. EMG previously showed myopathic units in the right hamstring, but no other abnormalities. MRI of the thigh shows fatty atrophy of the right hamstring. Patient has been able to strength his right hamstring with physical therapy. At this time I think a concerning neurological cause of patient's symptoms is very unlikely. I suspect that patient had a remote focal muscle injury to the hamstrings at some point. He also has presumably lifelong right pectoralis minor atrophy. He should return to neurology clinic if he develops any other concerning symptoms.      Follow up in Neurology clinic as needed should new concerns arise.    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    Patient noticed atrophy in the right hamstring last year. He has been an avid runner his whole life. His wife thought he has been running with a slight limp for a long time (as far back as 2003). He denies any history of specific injury or trauma to the right hamstrings. He does not have a clear history of sciatic nerve pain.     He started physical therapy and noticed that on hamstring curls there was a difference from right to left. Initially he couldn't lift any weight, but now has increased it up to 20  "pounds.      Patient also notes that he has had right pectoralis minor atrophy. 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       Past Medical History:   There is no problem list on file for this patient.    No past medical history on file.     Past Surgical History:   No past surgical history on file.     Social History:         Family History:   No family history on file.     Medications:     Current Outpatient Medications   Medication Sig Dispense Refill    rosuvastatin (CRESTOR) 10 MG tablet Take 10 mg by mouth daily       No current facility-administered medications for this visit.        Allergies:   No Known Allergies     Review of Systems:   As above     Physical Exam:   Vitals: /69   Pulse (!) 47   Ht 1.657 m (5' 5.25\")   Wt 52.2 kg (115 lb)   BMI 18.99 kg/m     General: Seated comfortably in no acute distress.  Lungs: breathing comfortably  Neurologic:     Mental Status: Fully alert, attentive. Language normal, speech clear and fluent, no paraphasic errors.      Cranial Nerves: Visual fields intact. PERRL. EOMI with normal smooth pursuit. Facial sensation intact/symmetric. Facial movements symmetric. Hearing not formally tested but intact to conversation. Palate elevation symmetric, uvula midline. No dysarthria. Shoulder shrug strong bilaterally. Tongue protrusion midline.     Motor: No tremors or other abnormal movements observed. Muscle tone normal throughout. Right hamstring and pectoralis atrophy. Normal/symmetric rapid finger tapping. Strength 5/5 throughout upper and lower extremities.      Right Left   Shoulder abduction        5 5   Elbow extension 5 5   Elbow flexion 5 5   Wrist extension         5 5   Finger extension 5 5   ADM 5 5   FDI 5 5   APB 5 5   Hip flexion 5 5   Knee flexion 5 5   Knee extension 5 5   Dorsiflexion 5 5   Plantar flexion 5 5        Deep Tendon Reflexes: 2+/symmetric throughout upper and lower extremities. No clonus. Toes downgoing " bilaterally.     Sensory: Intact/symmetric to light touch, temperature, vibration and proprioception throughout upper and lower extremities.      Coordination: Finger-nose-finger and heel-shin intact without dysmetria.      Gait: Normal, steady casual gait.          Data: Pertinent prior to visit   Imaging:  MR thigh 1/2025  1.  Isolated fatty atrophy of the right-sided hamstring musculature. No proximal detachment. No suspicion of distal detachment.   2.  Statistically most likely causes would include posttraumatic, postinflammatory, or post denervation/disuse. No clear cause demonstrated on this examination.   3.  No lesion or lymphadenopathy causing neurovascular impingement.   4.  No other muscle signal abnormality or atrophy demonstrated within the right thigh or the visualized left thigh.   Personally reviewed and agree with above impression    Procedures:  EMG 9/2024  Interpretation:  This is a mildly abnormal examination. There is electrophysiologic evidence of mild asymmetry in the right semitendinosus motor unit action potentials, which could be reflective for focal right sided myopathy in the corrective clinical context. MRI right thigh is recommended to evaluate for radiographic myopathic changes in the right semitendinosus.

## 2025-05-28 ENCOUNTER — PRE VISIT (OUTPATIENT)
Dept: NEUROLOGY | Facility: CLINIC | Age: 55
End: 2025-05-28

## 2025-05-28 ENCOUNTER — OFFICE VISIT (OUTPATIENT)
Dept: NEUROLOGY | Facility: CLINIC | Age: 55
End: 2025-05-28
Attending: PREVENTIVE MEDICINE
Payer: COMMERCIAL

## 2025-05-28 VITALS
HEART RATE: 47 BPM | WEIGHT: 115 LBS | DIASTOLIC BLOOD PRESSURE: 69 MMHG | HEIGHT: 65 IN | SYSTOLIC BLOOD PRESSURE: 103 MMHG | BODY MASS INDEX: 19.16 KG/M2

## 2025-05-28 DIAGNOSIS — M62.561 ATROPHY OF MUSCLE OF RIGHT LOWER LEG: Primary | ICD-10-CM

## 2025-05-28 ASSESSMENT — PAIN SCALES - GENERAL: PAINLEVEL_OUTOF10: NO PAIN (0)

## 2025-05-28 NOTE — LETTER
5/28/2025      Cristino Wyatt  [address] [place] [id number]      Dear Colleague,    Thank you for referring your patient, Cristino Wyatt, to the [facility] [place]. Please see a copy of my visit note below.    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 This was first noted in 2024, but it is unknown how long it has been there for. EMG previously showed myopathic units in the right hamstring, but no other abnormalities. MRI of the thigh shows fatty atrophy of the right hamstring. Patient has been able to strength his right hamstring with physical therapy. At this time I think a concerning neurological cause of patient's symptoms is very unlikely. I suspect that patient had a remote focal muscle injury to the hamstrings at some point. He also has presumably lifelong right pectoralis minor atrophy. He should return to neurology clinic if he develops any other concerning symptoms.      Follow up in Neurology clinic as needed should new concerns arise.    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    Patient noticed atrophy in the right hamstring last year. He has been an avid runner his whole life. His wife thought he has been running with a slight limp for a long time (as far back as 2003). He denies any history of specific injury or trauma to the right  "hamstrings. He does not have a clear history of sciatic nerve pain.     He started physical therapy and noticed that on hamstring curls there was a difference from right to left. Initially he couldn't lift any weight, but now has increased it up to 20 pounds.      Patient also notes that he has had right pectoralis minor atrophy. 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       Past Medical History:   There is no problem list on file for this patient.    No past medical history on file.     Past Surgical History:   No past surgical history on file.     Social History:         Family History:   No family history on file.     Medications:     Current Outpatient Medications   Medication Sig Dispense Refill     rosuvastatin (CRESTOR) 10 MG tablet Take 10 mg by mouth daily       No current facility-administered medications for this visit.        Allergies:   No Known Allergies     Review of Systems:   As above     Physical Exam:   Vitals: /69   Pulse (!) 47   Ht 1.657 m (5' 5.25\")   Wt 52.2 kg (115 lb)   BMI 18.99 kg/m     General: Seated comfortably in no acute distress.  Lungs: breathing comfortably  Neurologic:     Mental Status: Fully alert, attentive. Language normal, speech clear and fluent, no paraphasic errors.      Cranial Nerves: Visual fields intact. PERRL. EOMI with normal smooth pursuit. Facial sensation intact/symmetric. Facial movements symmetric. Hearing not formally tested but intact to conversation. Palate elevation symmetric, uvula midline. No dysarthria. Shoulder shrug strong bilaterally. Tongue protrusion midline.     Motor: No tremors or other abnormal movements observed. Muscle tone normal throughout. Right hamstring and pectoralis atrophy. Normal/symmetric rapid finger tapping. Strength 5/5 throughout upper and lower extremities.      Right Left   Shoulder abduction        5 5   Elbow extension 5 5   Elbow flexion 5 5   Wrist extension         5 5 "   Finger extension 5 5   ADM 5 5   FDI 5 5   APB 5 5   Hip flexion 5 5   Knee flexion 5 5   Knee extension 5 5   Dorsiflexion 5 5   Plantar flexion 5 5        Deep Tendon Reflexes: 2+/symmetric throughout upper and lower extremities. No clonus. Toes downgoing bilaterally.     Sensory: Intact/symmetric to light touch, temperature, vibration and proprioception throughout upper and lower extremities.      Coordination: Finger-nose-finger and heel-shin intact without dysmetria.      Gait: Normal, steady casual gait.          Data: Pertinent prior to visit   Imaging:  MR thigh 1/2025  1.  Isolated fatty atrophy of the right-sided hamstring musculature. No proximal detachment. No suspicion of distal detachment.   2.  Statistically most likely causes would include posttraumatic, postinflammatory, or post denervation/disuse. No clear cause demonstrated on this examination.   3.  No lesion or lymphadenopathy causing neurovascular impingement.   4.  No other muscle signal abnormality or atrophy demonstrated within the right thigh or the visualized left thigh.   Personally reviewed and agree with above impression    Procedures:  EMG 9/2024  Interpretation:  This is a mildly abnormal examination. There is electrophysiologic evidence of mild asymmetry in the right semitendinosus motor unit action potentials, which could be reflective for focal right sided myopathy in the corrective clinical context. MRI right thigh is recommended to evaluate for radiographic myopathic changes in the right semitendinosus.        Again, thank you for allowing me to participate in the care of your patient.        Sincerely,        George Lacey MD    Electronically signed

## 2025-06-08 ENCOUNTER — HEALTH MAINTENANCE LETTER (OUTPATIENT)
Age: 55
End: 2025-06-08